# Patient Record
Sex: FEMALE | Race: WHITE | NOT HISPANIC OR LATINO | Employment: OTHER | URBAN - METROPOLITAN AREA
[De-identification: names, ages, dates, MRNs, and addresses within clinical notes are randomized per-mention and may not be internally consistent; named-entity substitution may affect disease eponyms.]

---

## 2023-05-04 LAB — URINE CULTURE: ABNORMAL

## 2023-07-27 LAB
BASOPHILS (10*3/UL) IN BLOOD BY AUTOMATED COUNT: 0.07 X10E9/L (ref 0–0.1)
BASOPHILS/100 LEUKOCYTES IN BLOOD BY AUTOMATED COUNT: 0.8 % (ref 0–2)
EOSINOPHILS (10*3/UL) IN BLOOD BY AUTOMATED COUNT: 0.23 X10E9/L (ref 0–0.7)
EOSINOPHILS/100 LEUKOCYTES IN BLOOD BY AUTOMATED COUNT: 2.5 % (ref 0–6)
ERYTHROCYTE DISTRIBUTION WIDTH (RATIO) BY AUTOMATED COUNT: 14.9 % (ref 11.5–14.5)
ERYTHROCYTE MEAN CORPUSCULAR HEMOGLOBIN CONCENTRATION (G/DL) BY AUTOMATED: 31.9 G/DL (ref 32–36)
ERYTHROCYTE MEAN CORPUSCULAR VOLUME (FL) BY AUTOMATED COUNT: 83 FL (ref 80–100)
ERYTHROCYTES (10*6/UL) IN BLOOD BY AUTOMATED COUNT: 4.38 X10E12/L (ref 4–5.2)
FERRITIN (UG/LL) IN SER/PLAS: 32 UG/L (ref 8–150)
HEMATOCRIT (%) IN BLOOD BY AUTOMATED COUNT: 36.4 % (ref 36–46)
HEMOGLOBIN (G/DL) IN BLOOD: 11.6 G/DL (ref 12–16)
IMMATURE GRANULOCYTES/100 LEUKOCYTES IN BLOOD BY AUTOMATED COUNT: 0.2 % (ref 0–0.9)
IRON (UG/DL) IN SER/PLAS: 40 UG/DL (ref 35–150)
IRON BINDING CAPACITY (UG/DL) IN SER/PLAS: 398 UG/DL (ref 240–445)
IRON SATURATION (%) IN SER/PLAS: 10 % (ref 25–45)
LEUKOCYTES (10*3/UL) IN BLOOD BY AUTOMATED COUNT: 9 X10E9/L (ref 4.4–11.3)
LYMPHOCYTES (10*3/UL) IN BLOOD BY AUTOMATED COUNT: 2.57 X10E9/L (ref 1.2–4.8)
LYMPHOCYTES/100 LEUKOCYTES IN BLOOD BY AUTOMATED COUNT: 28.4 % (ref 13–44)
MONOCYTES (10*3/UL) IN BLOOD BY AUTOMATED COUNT: 0.83 X10E9/L (ref 0.1–1)
MONOCYTES/100 LEUKOCYTES IN BLOOD BY AUTOMATED COUNT: 9.2 % (ref 2–10)
NEUTROPHILS (10*3/UL) IN BLOOD BY AUTOMATED COUNT: 5.32 X10E9/L (ref 1.2–7.7)
NEUTROPHILS/100 LEUKOCYTES IN BLOOD BY AUTOMATED COUNT: 58.9 % (ref 40–80)
NRBC (PER 100 WBCS) BY AUTOMATED COUNT: 0 /100 WBC (ref 0–0)
PLATELETS (10*3/UL) IN BLOOD AUTOMATED COUNT: 272 X10E9/L (ref 150–450)

## 2023-11-09 ENCOUNTER — TELEPHONE (OUTPATIENT)
Dept: GASTROENTEROLOGY | Facility: CLINIC | Age: 68
End: 2023-11-09
Payer: MEDICAID

## 2023-12-07 DIAGNOSIS — I25.10 ATHEROSCLEROTIC HEART DISEASE OF NATIVE CORONARY ARTERY WITHOUT ANGINA PECTORIS: ICD-10-CM

## 2023-12-08 DIAGNOSIS — I10 BENIGN ESSENTIAL HYPERTENSION: ICD-10-CM

## 2023-12-08 PROBLEM — M54.16 LUMBAR RADICULOPATHY: Status: ACTIVE | Noted: 2023-12-08

## 2023-12-08 PROBLEM — E53.8 VITAMIN B12 DEFICIENCY: Status: ACTIVE | Noted: 2023-12-08

## 2023-12-08 PROBLEM — F17.200 CURRENT EVERY DAY SMOKER: Status: ACTIVE | Noted: 2023-12-08

## 2023-12-08 PROBLEM — F10.20 ALCOHOLISM (MULTI): Status: ACTIVE | Noted: 2023-12-08

## 2023-12-08 PROBLEM — G47.00 INSOMNIA: Status: ACTIVE | Noted: 2023-12-08

## 2023-12-08 PROBLEM — M54.50 LOW BACK PAIN: Status: ACTIVE | Noted: 2023-12-08

## 2023-12-08 PROBLEM — F31.9 BIPOLAR DISORDER, UNSPECIFIED (MULTI): Status: ACTIVE | Noted: 2023-12-08

## 2023-12-08 PROBLEM — F32.A DEPRESSION: Status: ACTIVE | Noted: 2023-12-08

## 2023-12-08 PROBLEM — F13.10 BENZODIAZEPINE ABUSE (MULTI): Status: ACTIVE | Noted: 2023-12-08

## 2023-12-08 RX ORDER — METOPROLOL SUCCINATE 25 MG/1
25 TABLET, EXTENDED RELEASE ORAL DAILY
COMMUNITY

## 2023-12-08 RX ORDER — LANOLIN ALCOHOL/MO/W.PET/CERES
1 CREAM (GRAM) TOPICAL DAILY
COMMUNITY
Start: 2021-07-12

## 2023-12-08 RX ORDER — SUCRALFATE 1 G/1
TABLET ORAL
COMMUNITY
Start: 2023-06-30 | End: 2024-02-05 | Stop reason: ALTCHOICE

## 2023-12-08 RX ORDER — FLUTICASONE FUROATE, UMECLIDINIUM BROMIDE AND VILANTEROL TRIFENATATE 200; 62.5; 25 UG/1; UG/1; UG/1
POWDER RESPIRATORY (INHALATION)
COMMUNITY
Start: 2023-01-09 | End: 2024-02-05 | Stop reason: ALTCHOICE

## 2023-12-08 RX ORDER — BUSPIRONE HYDROCHLORIDE 30 MG/1
30 TABLET ORAL 2 TIMES DAILY
COMMUNITY
End: 2024-02-02

## 2023-12-08 RX ORDER — LISINOPRIL 20 MG/1
20 TABLET ORAL DAILY
COMMUNITY
End: 2024-02-05 | Stop reason: ALTCHOICE

## 2023-12-08 RX ORDER — ATORVASTATIN CALCIUM 80 MG/1
80 TABLET, FILM COATED ORAL DAILY
COMMUNITY
Start: 2023-11-30 | End: 2024-01-03 | Stop reason: SDUPTHER

## 2023-12-08 RX ORDER — TRAZODONE HYDROCHLORIDE 100 MG/1
100 TABLET ORAL NIGHTLY
COMMUNITY
End: 2024-02-19 | Stop reason: SDUPTHER

## 2023-12-08 RX ORDER — PNV NO.95/FERROUS FUM/FOLIC AC 28MG-0.8MG
1 TABLET ORAL DAILY
COMMUNITY
Start: 2022-06-28

## 2023-12-08 RX ORDER — HYDROXYZINE PAMOATE 50 MG/1
CAPSULE ORAL
COMMUNITY
End: 2024-02-19 | Stop reason: WASHOUT

## 2023-12-08 RX ORDER — GABAPENTIN 300 MG/1
300 CAPSULE ORAL 3 TIMES DAILY
COMMUNITY
End: 2024-02-05 | Stop reason: ALTCHOICE

## 2023-12-08 RX ORDER — FOLIC ACID 1 MG/1
1 TABLET ORAL DAILY
COMMUNITY
Start: 2021-07-12 | End: 2024-02-05 | Stop reason: ALTCHOICE

## 2023-12-08 RX ORDER — CHOLECALCIFEROL (VITAMIN D3) 25 MCG
1 TABLET ORAL DAILY
COMMUNITY
Start: 2021-12-14

## 2023-12-08 RX ORDER — ALBUTEROL SULFATE 90 UG/1
AEROSOL, METERED RESPIRATORY (INHALATION)
COMMUNITY
Start: 2023-01-09 | End: 2024-04-08 | Stop reason: SDUPTHER

## 2023-12-08 RX ORDER — PANTOPRAZOLE SODIUM 40 MG/1
40 TABLET, DELAYED RELEASE ORAL 2 TIMES DAILY
COMMUNITY
Start: 2023-07-28

## 2023-12-08 RX ORDER — FLUOXETINE HYDROCHLORIDE 40 MG/1
40 CAPSULE ORAL DAILY
COMMUNITY
End: 2024-02-19 | Stop reason: SDUPTHER

## 2023-12-08 RX ORDER — CLOPIDOGREL BISULFATE 75 MG/1
75 TABLET ORAL DAILY
COMMUNITY
Start: 2023-12-03 | End: 2023-12-08 | Stop reason: SDUPTHER

## 2023-12-08 RX ORDER — ESOMEPRAZOLE MAGNESIUM 40 MG/1
40 CAPSULE, DELAYED RELEASE ORAL DAILY
COMMUNITY

## 2023-12-09 RX ORDER — CLOPIDOGREL BISULFATE 75 MG/1
75 TABLET ORAL DAILY
Qty: 30 TABLET | Refills: 0 | Status: SHIPPED | OUTPATIENT
Start: 2023-12-09 | End: 2024-01-08

## 2023-12-11 RX ORDER — CLOPIDOGREL BISULFATE 75 MG/1
75 TABLET ORAL DAILY
Qty: 90 TABLET | Refills: 3 | Status: SHIPPED | OUTPATIENT
Start: 2023-12-11 | End: 2024-04-15 | Stop reason: SDUPTHER

## 2023-12-26 DIAGNOSIS — I25.10 ATHEROSCLEROTIC HEART DISEASE OF NATIVE CORONARY ARTERY WITHOUT ANGINA PECTORIS: ICD-10-CM

## 2023-12-27 ENCOUNTER — TELEPHONE (OUTPATIENT)
Dept: PRIMARY CARE | Facility: CLINIC | Age: 68
End: 2023-12-27
Payer: MEDICAID

## 2024-01-03 ENCOUNTER — TELEPHONE (OUTPATIENT)
Dept: PRIMARY CARE | Facility: CLINIC | Age: 69
End: 2024-01-03
Payer: MEDICAID

## 2024-01-03 DIAGNOSIS — I10 BENIGN ESSENTIAL HYPERTENSION: Primary | ICD-10-CM

## 2024-01-03 RX ORDER — ATORVASTATIN CALCIUM 80 MG/1
80 TABLET, FILM COATED ORAL DAILY
Qty: 90 TABLET | Refills: 1 | Status: SHIPPED | OUTPATIENT
Start: 2024-01-03 | End: 2025-01-02

## 2024-01-10 RX ORDER — ATORVASTATIN CALCIUM 80 MG/1
80 TABLET, FILM COATED ORAL DAILY
Qty: 90 TABLET | Refills: 1 | OUTPATIENT
Start: 2024-01-10

## 2024-01-15 ENCOUNTER — APPOINTMENT (OUTPATIENT)
Dept: CARDIOLOGY | Facility: CLINIC | Age: 69
End: 2024-01-15
Payer: MEDICAID

## 2024-01-29 ENCOUNTER — APPOINTMENT (OUTPATIENT)
Dept: PRIMARY CARE | Facility: CLINIC | Age: 69
End: 2024-01-29
Payer: MEDICAID

## 2024-02-01 PROBLEM — F41.1 GENERALIZED ANXIETY DISORDER: Status: ACTIVE | Noted: 2021-07-22

## 2024-02-01 PROBLEM — K21.9 GERD (GASTROESOPHAGEAL REFLUX DISEASE): Status: ACTIVE | Noted: 2024-02-01

## 2024-02-01 PROBLEM — F10.929 ALCOHOLIC INTOXICATION (CMS-HCC): Status: ACTIVE | Noted: 2024-02-01

## 2024-02-01 PROBLEM — F32.1 CURRENT MODERATE EPISODE OF MAJOR DEPRESSIVE DISORDER WITHOUT PRIOR EPISODE (MULTI): Chronic | Status: ACTIVE | Noted: 2021-07-26

## 2024-02-01 PROBLEM — A63.0 GENITAL WARTS: Status: ACTIVE | Noted: 2024-02-01

## 2024-02-05 ENCOUNTER — OFFICE VISIT (OUTPATIENT)
Dept: CARDIOLOGY | Facility: CLINIC | Age: 69
End: 2024-02-05
Payer: MEDICARE

## 2024-02-05 VITALS
HEART RATE: 64 BPM | BODY MASS INDEX: 23.57 KG/M2 | DIASTOLIC BLOOD PRESSURE: 82 MMHG | SYSTOLIC BLOOD PRESSURE: 150 MMHG | HEIGHT: 63 IN | WEIGHT: 133 LBS

## 2024-02-05 DIAGNOSIS — E78.49 OTHER HYPERLIPIDEMIA: ICD-10-CM

## 2024-02-05 DIAGNOSIS — I25.10 CORONARY ARTERY DISEASE INVOLVING NATIVE CORONARY ARTERY OF NATIVE HEART, UNSPECIFIED WHETHER ANGINA PRESENT: Primary | ICD-10-CM

## 2024-02-05 DIAGNOSIS — I10 BENIGN ESSENTIAL HYPERTENSION: ICD-10-CM

## 2024-02-05 DIAGNOSIS — R07.89 CHEST DISCOMFORT: ICD-10-CM

## 2024-02-05 DIAGNOSIS — F17.200 NICOTINE DEPENDENCE, UNCOMPLICATED, UNSPECIFIED NICOTINE PRODUCT TYPE: ICD-10-CM

## 2024-02-05 PROCEDURE — 1157F ADVNC CARE PLAN IN RCRD: CPT | Performed by: INTERNAL MEDICINE

## 2024-02-05 PROCEDURE — 93000 ELECTROCARDIOGRAM COMPLETE: CPT | Performed by: INTERNAL MEDICINE

## 2024-02-05 PROCEDURE — 4004F PT TOBACCO SCREEN RCVD TLK: CPT | Performed by: INTERNAL MEDICINE

## 2024-02-05 PROCEDURE — 3079F DIAST BP 80-89 MM HG: CPT | Performed by: INTERNAL MEDICINE

## 2024-02-05 PROCEDURE — 1160F RVW MEDS BY RX/DR IN RCRD: CPT | Performed by: INTERNAL MEDICINE

## 2024-02-05 PROCEDURE — 1159F MED LIST DOCD IN RCRD: CPT | Performed by: INTERNAL MEDICINE

## 2024-02-05 PROCEDURE — 99214 OFFICE O/P EST MOD 30 MIN: CPT | Performed by: INTERNAL MEDICINE

## 2024-02-05 PROCEDURE — 3077F SYST BP >= 140 MM HG: CPT | Performed by: INTERNAL MEDICINE

## 2024-02-05 RX ORDER — FLUOXETINE HYDROCHLORIDE 20 MG/1
20 CAPSULE ORAL DAILY
COMMUNITY
End: 2024-02-19 | Stop reason: ALTCHOICE

## 2024-02-05 NOTE — PROGRESS NOTES
"Chief Complaint:   Please see below.     History Of Present Illness:    Carole Perez is a 68 y.o. female presenting with CAD, chest discomfort.    This 68-year-old hypertensive, hyperlipidemic woman with a 20-pack-year history of tobacco abuse has a history of coronary artery disease.  She suffered a non-ST segment elevation MI in June 2023, at which time she underwent PCI of the right coronary artery for single-vessel disease.  She last saw a cardiologist in July 2023.    She reports that she gets heaviness in her chest a couple of times per day.  Usually the heaviness is located in her mid chest and it feels like someone hit her in that region.  The symptoms last about 2 minutes and then resolved.  She sometimes feels a little short of breath along with the chest discomfort.  The chest discomfort does not radiate into the neck, jaw, arms, or elsewhere.  She has had such symptoms occur during housework and household chores as well.  She denies palpitations, orthopnea, PND, and syncope.  Sometimes she gets a little lightheaded when she is climbing out of the bathtub.  Her functional capacity is limited.  She has to stop after walking about 100 feet or so.    Unfortunately she is still smoking cigarettes.     Last Recorded Vitals:  Vitals:    02/05/24 0900   BP: 150/82   BP Location: Left arm   Patient Position: Sitting   Pulse: 64   Weight: 60.3 kg (133 lb)   Height: 1.6 m (5' 3\")   Body mass index is 23.56 kg/m².      Past Medical History:  She has a past medical history of Acute maxillary sinusitis, unspecified (12/01/2021).    Past Surgical History:  She has a past surgical history that includes Hysterectomy (04/17/2014); Colonoscopy (04/17/2014); Other surgical history (12/02/2021); Other surgical history (12/02/2021); and Other surgical history (12/02/2021).      Social History:  She reports that she has been smoking cigarettes. She started smoking about 56 years ago. She has been smoking an average of .5 " packs per day. She has never used smokeless tobacco. She reports current alcohol use of about 3.0 standard drinks of alcohol per week. She reports that she does not use drugs.    Family History:  Family History   Problem Relation Name Age of Onset    Hypertension Mother      Stroke Mother      Other (brain tumor) Father          Allergies:  Patient has no known allergies.    Outpatient Medications:  Current Outpatient Medications   Medication Instructions    albuterol 90 mcg/actuation inhaler INHALE 1 TO 2 PUFFS BY MOUTH EVERY 4 TO 6 HOURS AS NEEDED AS DIRECTED    aspirin 81 mg chewable tablet CHEW 1 TABLET BY MOUTH ONCE DAILY    atorvastatin (LIPITOR) 80 mg, oral, Daily    busPIRone (BUSPAR) 30 mg, oral, 2 times daily    cholecalciferol (Vitamin D-3) 25 MCG (1000 UT) tablet 1 tablet, oral, Daily    clopidogrel (PLAVIX) 75 mg, oral, Daily    cyanocobalamin (Vitamin B-12) 100 mcg tablet 1 tablet, oral, Daily    esomeprazole (NEXIUM) 40 mg, oral, Daily    FLUoxetine (PROZAC) 40 mg, oral, Daily    FLUoxetine (PROZAC) 20 mg, oral, Daily    hydrOXYzine pamoate (Vistaril) 50 mg capsule TAKE 1 CAPSULE BY MOUTH FOUR TIMES DAILY AS NEEDED for anxiety PLEASE KEEP PENDING APPOINTMENT FOR FURTHER REFILL    metoprolol succinate XL (TOPROL-XL) 25 mg, oral, Daily    pantoprazole (PROTONIX) 40 mg, oral, 2 times daily    thiamine 100 mg tablet 1 tablet, oral, Daily    traZODone (DESYREL) 100 mg, oral, Nightly       Physical Exam:  GENERAL:  pleasant 68 year-old  HEENT: No xanthelasma  NECK: Supple, no palpable adenopathy or thyromegaly  CHEST: Clear to auscultation, respiratory effort unlabored  CARDIAC: RRR, normal S1 and S2, no audible murmur, rub, gallop, carotids are brisk, PMI is not displaced  ABD: Active bowel sounds, nontender, no organomegaly, no evidence of ascites  EXT: No clubbing, cyanosis, edema, or tenderness  NEURO: Awake, alert, appropriate, speech is fluent       Last Labs:  CBC -  Lab Results   Component Value  Date    WBC 9.0 07/27/2023    HGB 11.6 (L) 07/27/2023    HCT 36.4 07/27/2023    MCV 83 07/27/2023     07/27/2023       CMP -  Lab Results   Component Value Date    CALCIUM 8.3 (L) 06/30/2023    PHOS 3.0 06/30/2023    PROT 6.6 06/28/2023    ALBUMIN 3.8 06/28/2023    AST 47 (H) 06/28/2023    ALT 10 06/28/2023    ALKPHOS 59 06/28/2023    BILITOT 0.5 06/28/2023       LIPID PANEL -   Lab Results   Component Value Date    CHOL 189 12/21/2020    TRIG 123 12/21/2020    HDL 63.0 12/21/2020    CHHDL 3.0 12/21/2020    LDLF 101 (H) 12/21/2020    VLDL 25 12/21/2020       RENAL FUNCTION PANEL -   Lab Results   Component Value Date    GLUCOSE 116 (H) 06/30/2023     (L) 06/30/2023    K 4.2 06/30/2023     06/30/2023    CO2 23 06/30/2023    ANIONGAP 14 06/30/2023    BUN 11 06/30/2023    CREATININE 0.47 (L) 06/30/2023    CALCIUM 8.3 (L) 06/30/2023    PHOS 3.0 06/30/2023    ALBUMIN 3.8 06/28/2023        Lab Results   Component Value Date     (H) 06/28/2023    HGBA1C 5.9 12/21/2020         No recent results to review    Assessment/Plan   Problem List Items Addressed This Visit          Cardiac and Vasculature    Benign essential hypertension    Relevant Orders    ECG 12 lead (Clinic Performed)    Chest discomfort    Relevant Orders    Nuclear Stress Test    Follow Up In Cardiology    Coronary artery disease involving native coronary artery of native heart - Primary    Relevant Orders    ECG 12 lead (Clinic Performed)    Lipid panel    CBC    Comprehensive metabolic panel    Other hyperlipidemia       Tobacco    Nicotine dependence, uncomplicated    Relevant Orders    Referral to Tobacco Cessation Counseling   This 68-year-old hypertensive, hyperlipidemic 20-pack-year smoker has coronary artery disease with prior non-ST segment elevation MI in June 2023 with subsequent PCI of the RCA for single-vessel disease.  She complains of daily episodes of chest heaviness as described, and her limited functional capacity  precludes treadmill exercise stress testing.  Our approach:    1.  Lexiscan SPECT  2.  Lipid profile, CMP, CBC  3.  Above all else, I advised the patient to quit tobacco, or else risk certain future cardiovascular morbidity, and/or mortality.  - referral to the tobacco cessation clinic.    Dragan Gómez MD

## 2024-02-05 NOTE — PATIENT INSTRUCTIONS
It was my pleasure to meet you.  I look forward to being your cardiologist.  I am a huge believer in communicating with my patients.  Please contact me at any time, if anything is not clear to you regarding anything we have discussed, or if new questions occur to you.     You need to stop smoking. As you know, smoking increases the risk of future heart attacks, strokes, cancer, and emphysema. In addition to these problems, someone who smokes a pack a day spends $2000 per year on tobacco alone. Those costs add up, so that someone who has smoked a pack a day for twenty years has spent $40,000 out of pocket on tobacco in their lifetime. To help you quit smoking, you should call the [place] Quit Line at 3-849-QUIT-NOW. They will have other tips to help you quit. Also, there are good medicines that can help you quit.  [facility] has a tobacco clinic that can guide you through the process.  Just let me know if you'd like a referral.     You should increase your intake of fresh fruits and vegetables.  Try to consume 9-12 servings per day of such foods.  You should increase your intake of deep sea fish such as salmon and tuna.  Try to get two servings per week of fish, but if you are a pregnant woman, talk to your obstetrician before increasing your fish intake.  You should increase your intake of unprocessed nuts such as walnuts or almonds.  Increase your intake of plant-based protein.  You should avoid fried foods.  Don't consume sugary or starchy foods and sugary drinks.  Avoid saturated fats.  Try not to dine at restaurants more than once per month, and don't dine at fast food places.  Try to get 7-9 hours of sleep every night.  Try to get 150 minutes per week of moderate intensity exercise (after I have cleared you to start an exercise program).  Try to maintain the appropriate weight for your height based on body mass index (BMI). Maintain your cholesterol, blood sugar, and blood pressure in the recommended  "respective normal ranges.  There is a wealth of information on the American Heart Association's website regarding this.  Just Google \"Life's Essential 8\" for more information.   Ask me about any of these details  if you have questions.    As your cardiologist, I will be available to you at any time to answer any question you have concerning your heart health.  My staff, [place] can also answer any questions you may have.  Best of luck.     It is important for us to have an accurate list of the medications, supplements, and their doses.  It is also important for us to have an accurate list of your allergies.  Please bring this information to every appointment.  This is a vital part of the quality of care you receive through all of your providers.   "

## 2024-02-19 ENCOUNTER — OFFICE VISIT (OUTPATIENT)
Dept: PRIMARY CARE | Facility: CLINIC | Age: 69
End: 2024-02-19
Payer: MEDICARE

## 2024-02-19 VITALS
DIASTOLIC BLOOD PRESSURE: 70 MMHG | TEMPERATURE: 98.2 F | HEIGHT: 63 IN | SYSTOLIC BLOOD PRESSURE: 112 MMHG | BODY MASS INDEX: 23.64 KG/M2 | WEIGHT: 133.4 LBS | HEART RATE: 70 BPM

## 2024-02-19 DIAGNOSIS — F31.9 BIPOLAR AFFECTIVE DISORDER, REMISSION STATUS UNSPECIFIED (MULTI): Primary | ICD-10-CM

## 2024-02-19 PROCEDURE — 99213 OFFICE O/P EST LOW 20 MIN: CPT | Performed by: FAMILY MEDICINE

## 2024-02-19 PROCEDURE — 1159F MED LIST DOCD IN RCRD: CPT | Performed by: FAMILY MEDICINE

## 2024-02-19 PROCEDURE — 4004F PT TOBACCO SCREEN RCVD TLK: CPT | Performed by: FAMILY MEDICINE

## 2024-02-19 PROCEDURE — 1157F ADVNC CARE PLAN IN RCRD: CPT | Performed by: FAMILY MEDICINE

## 2024-02-19 PROCEDURE — 1160F RVW MEDS BY RX/DR IN RCRD: CPT | Performed by: FAMILY MEDICINE

## 2024-02-19 PROCEDURE — 3074F SYST BP LT 130 MM HG: CPT | Performed by: FAMILY MEDICINE

## 2024-02-19 PROCEDURE — 3078F DIAST BP <80 MM HG: CPT | Performed by: FAMILY MEDICINE

## 2024-02-19 RX ORDER — FLUOXETINE HYDROCHLORIDE 40 MG/1
40 CAPSULE ORAL DAILY
Qty: 90 CAPSULE | Refills: 1 | Status: SHIPPED | OUTPATIENT
Start: 2024-02-19 | End: 2025-02-18

## 2024-02-19 RX ORDER — TRAZODONE HYDROCHLORIDE 100 MG/1
100 TABLET ORAL NIGHTLY
Qty: 90 TABLET | Refills: 1 | Status: SHIPPED | OUTPATIENT
Start: 2024-02-19 | End: 2025-02-18

## 2024-02-19 ASSESSMENT — PATIENT HEALTH QUESTIONNAIRE - PHQ9
1. LITTLE INTEREST OR PLEASURE IN DOING THINGS: NEARLY EVERY DAY
2. FEELING DOWN, DEPRESSED OR HOPELESS: NEARLY EVERY DAY
SUM OF ALL RESPONSES TO PHQ9 QUESTIONS 1 AND 2: 6

## 2024-02-19 NOTE — PROGRESS NOTES
"    /70   Pulse 70   Temp 36.8 °C (98.2 °F)   Ht 1.6 m (5' 3\")   Wt 60.5 kg (133 lb 6.4 oz)   BMI 23.63 kg/m²     Past Medical History:   Diagnosis Date    Acute maxillary sinusitis, unspecified 12/01/2021    Acute maxillary sinusitis       Patient Active Problem List   Diagnosis    Alcoholism (CMS/[facility])    Benign essential hypertension    Benzodiazepine abuse (CMS/[facility])    Nicotine dependence, uncomplicated    Bipolar disorder, unspecified (CMS/[facility])    Depression    Insomnia    Low back pain    Lumbar radiculopathy    Vitamin B12 deficiency    GERD (gastroesophageal reflux disease)    Genital warts    Generalized anxiety disorder    Current moderate episode of major depressive disorder without prior episode (CMS/[facility])    Alcoholic intoxication (CMS/[facility])    Chest discomfort    Coronary artery disease involving native coronary artery of native heart    Other hyperlipidemia       Current Outpatient Medications   Medication Sig Dispense Refill    albuterol 90 mcg/actuation inhaler INHALE 1 TO 2 PUFFS BY MOUTH EVERY 4 TO 6 HOURS AS NEEDED AS DIRECTED      aspirin 81 mg chewable tablet CHEW 1 TABLET BY MOUTH ONCE DAILY 30 tablet 1    atorvastatin (Lipitor) 80 mg tablet Take 1 tablet (80 mg) by mouth once daily. 90 tablet 1    busPIRone (Buspar) 30 mg tablet TAKE 1 TABLET by mouth TWICE DAILY. 180 tablet 0    cholecalciferol (Vitamin D-3) 25 MCG (1000 UT) tablet Take 1 tablet (25 mcg) by mouth once daily.      clopidogrel (Plavix) 75 mg tablet TAKE 1 TABLET BY MOUTH EVERY DAY 90 tablet 3    cyanocobalamin (Vitamin B-12) 100 mcg tablet Take 1 tablet (100 mcg) by mouth once daily.      esomeprazole (NexIUM) 40 mg DR capsule Take 1 capsule (40 mg) by mouth once daily.      FLUoxetine (PROzac) 20 mg capsule Take 1 capsule (20 mg) by mouth once daily.      FLUoxetine (PROzac) 40 mg capsule Take 1 capsule (40 mg) by mouth once daily.      metoprolol succinate XL (Toprol-XL) 25 mg 24 hr tablet Take 1 tablet (25 mg) by " mouth once daily.      pantoprazole (ProtoNix) 40 mg EC tablet Take 1 tablet (40 mg) by mouth 2 times a day.      thiamine 100 mg tablet Take 1 tablet (100 mg) by mouth once daily.      traZODone (Desyrel) 100 mg tablet Take 1 tablet (100 mg) by mouth once daily at bedtime.      hydrOXYzine pamoate (Vistaril) 50 mg capsule TAKE 1 CAPSULE BY MOUTH FOUR TIMES DAILY AS NEEDED for anxiety PLEASE KEEP PENDING APPOINTMENT FOR FURTHER REFILL       No current facility-administered medications for this visit.       CC/HPI/ASSESSMENT/PLAN    CC: Medication    HPI patient with a history of bipolar affective disorder requesting refills for Prozac and trazodone.  Patient saw cardiology as scheduled for extensive cardiac testing and blood work.  She denies fever chills shortness of breath.  Patient notes a cyst in the right armpit that is been bothering her    Exam calm neuro alert and oriented CN II through intact psych calm pleasant female no psychosis.  Skin exam reveals a sebaceous cyst in the right axilla.  We discussed seeing dermatology    A/P 1 bipolar disorder stable.  Medicines refilled.  Patient will see dermatology for the cyst on the right armpit.  She will follow-up with cardiology as previously scheduled and have cardiac testing as scheduled.  Patient notes she is also experiencing some pain in the left hip.  She will monitor the pain and follow-up after cardiac testing complete    There are no diagnoses linked to this encounter.

## 2024-03-13 ENCOUNTER — TELEPHONE (OUTPATIENT)
Dept: CARDIOLOGY | Facility: CLINIC | Age: 69
End: 2024-03-13
Payer: MEDICARE

## 2024-03-13 ENCOUNTER — HOSPITAL ENCOUNTER (OUTPATIENT)
Dept: CARDIOLOGY | Facility: HOSPITAL | Age: 69
Discharge: HOME | End: 2024-03-13
Payer: MEDICARE

## 2024-03-13 DIAGNOSIS — R07.89 CHEST DISCOMFORT: ICD-10-CM

## 2024-03-13 NOTE — TELEPHONE ENCOUNTER
Patient called, she was scheduled for her ordered Nan Stress Test for this morning.  Patient followed our instructions, showed up and was told that her test had not been approved, go home and to call our office.    We were Never notified by the  [employer] Dept. that the test was not approved.  Never informed that we needed to do anything ( ie: submit more records or reschedule the test).    I apologized to the patient, told her I would look into this and call her back.    I emailed Starr Espinosa ([employer]ist) all of the above, I will await her reply/return email.  ---ssd.  ------------------------------------------  Per Starr Espinosa, she saw this when she got into work at 8:30am.  She was able to get the approval and updated the system probably right after the patient was sent home.    I reached out to Angelito Drake ( [facility] nuclear dept.) and asked if he could help get Carole's Nan rescheduled asap.    I will await Angelito's return call.  ---ssd.  -----------------------------------------

## 2024-03-14 ENCOUNTER — APPOINTMENT (OUTPATIENT)
Dept: PRIMARY CARE | Facility: CLINIC | Age: 69
End: 2024-03-14
Payer: MEDICARE

## 2024-03-18 ENCOUNTER — APPOINTMENT (OUTPATIENT)
Dept: CARDIOLOGY | Facility: CLINIC | Age: 69
End: 2024-03-18
Payer: MEDICARE

## 2024-03-22 ENCOUNTER — HOSPITAL ENCOUNTER (OUTPATIENT)
Dept: RADIOLOGY | Facility: HOSPITAL | Age: 69
Discharge: HOME | End: 2024-03-22
Payer: MEDICARE

## 2024-03-22 ENCOUNTER — APPOINTMENT (OUTPATIENT)
Dept: RADIOLOGY | Facility: HOSPITAL | Age: 69
End: 2024-03-22
Payer: MEDICARE

## 2024-03-22 ENCOUNTER — APPOINTMENT (OUTPATIENT)
Dept: CARDIOLOGY | Facility: HOSPITAL | Age: 69
End: 2024-03-22
Payer: MEDICARE

## 2024-03-22 ENCOUNTER — HOSPITAL ENCOUNTER (OUTPATIENT)
Dept: CARDIOLOGY | Facility: HOSPITAL | Age: 69
Discharge: HOME | End: 2024-03-22
Payer: MEDICARE

## 2024-03-22 DIAGNOSIS — R07.9 CHEST PAIN, UNSPECIFIED: ICD-10-CM

## 2024-03-22 DIAGNOSIS — R07.89 CHEST DISCOMFORT: ICD-10-CM

## 2024-03-22 PROCEDURE — 3430000001 HC RX 343 DIAGNOSTIC RADIOPHARMACEUTICALS: Performed by: INTERNAL MEDICINE

## 2024-03-22 PROCEDURE — 78452 HT MUSCLE IMAGE SPECT MULT: CPT

## 2024-03-22 PROCEDURE — 93017 CV STRESS TEST TRACING ONLY: CPT

## 2024-03-22 PROCEDURE — A9502 TC99M TETROFOSMIN: HCPCS | Performed by: INTERNAL MEDICINE

## 2024-03-22 PROCEDURE — 78452 HT MUSCLE IMAGE SPECT MULT: CPT | Performed by: INTERNAL MEDICINE

## 2024-03-22 RX ADMIN — TETROFOSMIN 10.3 MILLICURIE: 0.23 INJECTION, POWDER, LYOPHILIZED, FOR SOLUTION INTRAVENOUS at 11:15

## 2024-03-22 RX ADMIN — TETROFOSMIN 31.7 MILLICURIE: 0.23 INJECTION, POWDER, LYOPHILIZED, FOR SOLUTION INTRAVENOUS at 13:15

## 2024-04-02 ENCOUNTER — HOSPITAL ENCOUNTER (EMERGENCY)
Facility: HOSPITAL | Age: 69
Discharge: HOME | End: 2024-04-02
Attending: STUDENT IN AN ORGANIZED HEALTH CARE EDUCATION/TRAINING PROGRAM
Payer: MEDICARE

## 2024-04-02 ENCOUNTER — APPOINTMENT (OUTPATIENT)
Dept: RADIOLOGY | Facility: HOSPITAL | Age: 69
End: 2024-04-02
Payer: MEDICARE

## 2024-04-02 VITALS
HEART RATE: 78 BPM | TEMPERATURE: 97.3 F | DIASTOLIC BLOOD PRESSURE: 81 MMHG | SYSTOLIC BLOOD PRESSURE: 177 MMHG | RESPIRATION RATE: 18 BRPM | WEIGHT: 130 LBS | OXYGEN SATURATION: 95 % | HEIGHT: 63 IN | BODY MASS INDEX: 23.04 KG/M2

## 2024-04-02 DIAGNOSIS — M25.552 BILATERAL HIP PAIN: Primary | ICD-10-CM

## 2024-04-02 DIAGNOSIS — M25.551 BILATERAL HIP PAIN: Primary | ICD-10-CM

## 2024-04-02 PROCEDURE — 73552 X-RAY EXAM OF FEMUR 2/>: CPT | Mod: 50

## 2024-04-02 PROCEDURE — 72170 X-RAY EXAM OF PELVIS: CPT

## 2024-04-02 PROCEDURE — 73552 X-RAY EXAM OF FEMUR 2/>: CPT | Mod: BILATERAL PROCEDURE | Performed by: RADIOLOGY

## 2024-04-02 PROCEDURE — 99284 EMERGENCY DEPT VISIT MOD MDM: CPT | Performed by: STUDENT IN AN ORGANIZED HEALTH CARE EDUCATION/TRAINING PROGRAM

## 2024-04-02 PROCEDURE — 72170 X-RAY EXAM OF PELVIS: CPT | Mod: FOREIGN READ | Performed by: RADIOLOGY

## 2024-04-02 PROCEDURE — 99284 EMERGENCY DEPT VISIT MOD MDM: CPT

## 2024-04-02 PROCEDURE — 2500000001 HC RX 250 WO HCPCS SELF ADMINISTERED DRUGS (ALT 637 FOR MEDICARE OP)

## 2024-04-02 RX ORDER — IBUPROFEN 600 MG/1
600 TABLET ORAL ONCE
Status: COMPLETED | OUTPATIENT
Start: 2024-04-02 | End: 2024-04-02

## 2024-04-02 RX ADMIN — IBUPROFEN 600 MG: 600 TABLET, FILM COATED ORAL at 15:45

## 2024-04-02 ASSESSMENT — PAIN SCALES - GENERAL
PAINLEVEL_OUTOF10: 8
PAINLEVEL_OUTOF10: 5 - MODERATE PAIN

## 2024-04-02 ASSESSMENT — LIFESTYLE VARIABLES
EVER HAD A DRINK FIRST THING IN THE MORNING TO STEADY YOUR NERVES TO GET RID OF A HANGOVER: NO
HAVE YOU EVER FELT YOU SHOULD CUT DOWN ON YOUR DRINKING: NO
EVER FELT BAD OR GUILTY ABOUT YOUR DRINKING: NO
TOTAL SCORE: 0
HAVE PEOPLE ANNOYED YOU BY CRITICIZING YOUR DRINKING: NO

## 2024-04-02 ASSESSMENT — PAIN - FUNCTIONAL ASSESSMENT
PAIN_FUNCTIONAL_ASSESSMENT: 0-10
PAIN_FUNCTIONAL_ASSESSMENT: 0-10

## 2024-04-02 ASSESSMENT — COLUMBIA-SUICIDE SEVERITY RATING SCALE - C-SSRS
6. HAVE YOU EVER DONE ANYTHING, STARTED TO DO ANYTHING, OR PREPARED TO DO ANYTHING TO END YOUR LIFE?: NO
1. IN THE PAST MONTH, HAVE YOU WISHED YOU WERE DEAD OR WISHED YOU COULD GO TO SLEEP AND NOT WAKE UP?: NO
2. HAVE YOU ACTUALLY HAD ANY THOUGHTS OF KILLING YOURSELF?: NO

## 2024-04-02 ASSESSMENT — PAIN DESCRIPTION - ORIENTATION: ORIENTATION: LEFT

## 2024-04-02 ASSESSMENT — PAIN DESCRIPTION - LOCATION: LOCATION: HIP

## 2024-04-02 ASSESSMENT — PAIN DESCRIPTION - PAIN TYPE: TYPE: ACUTE PAIN

## 2024-04-02 NOTE — ED PROVIDER NOTES
EMERGENCY DEPARTMENT ENCOUNTER      Pt Name: Carole Perez  MRN: [id number]  Birthdate 1955  Date of evaluation: 4/2/2024  Provider: Uriel Weeks MD    CHIEF COMPLAINT       Chief Complaint   Patient presents with    Leg Pain     Left. Mechanical fall 3 days ago.          HISTORY OF PRESENT ILLNESS    HPI  Patient is a 68-year-old female with history of HTN, HLD, EtOH abuse, bipolar disorder, CAD on Plavix presenting after a fall.  She fell at least 1 month ago and struck her left hip.  Since then she has been having worsening pain and had difficulty getting around.  3 days ago, she caught her toes on the lip of a step and ended up falling down, striking her right hip.  Since then, she has had to walk with a cane.  Pain is 7/10, sharp, radiating down the leg, exacerbated with any movement, and without significant alleviating factors.  She did not strike her head or lose consciousness.  She denies visual changes, nausea, vomiting, headache.    Nursing Notes were reviewed.    PAST MEDICAL HISTORY     Past Medical History:   Diagnosis Date    Acute maxillary sinusitis, unspecified 12/01/2021    Acute maxillary sinusitis         SURGICAL HISTORY       Past Surgical History:   Procedure Laterality Date    COLONOSCOPY  04/17/2014    Colonoscopy (Fiberoptic)    HYSTERECTOMY  04/17/2014    Hysterectomy    OTHER SURGICAL HISTORY  12/02/2021    Nasal septoplasty    OTHER SURGICAL HISTORY  12/02/2021    Ectopic pregnancy removal with oophorectomy    OTHER SURGICAL HISTORY  12/02/2021    Hysterocolpectomy vaginal         CURRENT MEDICATIONS       Discharge Medication List as of 4/2/2024  3:37 PM        CONTINUE these medications which have NOT CHANGED    Details   albuterol 90 mcg/actuation inhaler INHALE 1 TO 2 PUFFS BY MOUTH EVERY 4 TO 6 HOURS AS NEEDED AS DIRECTED, Historical Med      aspirin 81 mg chewable tablet CHEW 1 TABLET BY MOUTH ONCE DAILY, Starting Thu 6/29/2023, Until Fri 6/28/2024, Normal      atorvastatin  (Lipitor) 80 mg tablet Take 1 tablet (80 mg) by mouth once daily., Starting Wed 1/3/2024, Until Thu 1/2/2025, Normal      busPIRone (Buspar) 30 mg tablet TAKE 1 TABLET by mouth TWICE DAILY., Starting Fri 2/2/2024, Normal      cholecalciferol (Vitamin D-3) 25 MCG (1000 UT) tablet Take 1 tablet (25 mcg) by mouth once daily., Starting Tue 12/14/2021, Historical Med      clopidogrel (Plavix) 75 mg tablet TAKE 1 TABLET BY MOUTH EVERY DAY, Starting Mon 12/11/2023, Normal      cyanocobalamin (Vitamin B-12) 100 mcg tablet Take 1 tablet (100 mcg) by mouth once daily., Starting Tue 6/28/2022, Historical Med      esomeprazole (NexIUM) 40 mg DR capsule Take 1 capsule (40 mg) by mouth once daily., Historical Med      FLUoxetine (PROzac) 40 mg capsule Take 1 capsule (40 mg) by mouth once daily., Starting Mon 2/19/2024, Until Tue 2/18/2025, Normal      metoprolol succinate XL (Toprol-XL) 25 mg 24 hr tablet Take 1 tablet (25 mg) by mouth once daily., Historical Med      pantoprazole (ProtoNix) 40 mg EC tablet Take 1 tablet (40 mg) by mouth 2 times a day., Starting Fri 7/28/2023, Historical Med      thiamine 100 mg tablet Take 1 tablet (100 mg) by mouth once daily., Starting Mon 7/12/2021, Historical Med      traZODone (Desyrel) 100 mg tablet Take 1 tablet (100 mg) by mouth once daily at bedtime., Starting Mon 2/19/2024, Until Tue 2/18/2025, Normal             ALLERGIES     Patient has no known allergies.    FAMILY HISTORY       Family History   Problem Relation Name Age of Onset    Hypertension Mother      Stroke Mother      Other (brain tumor) Father            SOCIAL HISTORY       Social History     Socioeconomic History    Marital status:      Spouse name: None    Number of children: None    Years of education: None    Highest education level: None   Occupational History    None   Tobacco Use    Smoking status: Every Day     Packs/day: .5     Types: Cigarettes     Start date: 1968    Smokeless tobacco: Never   Substance  and Sexual Activity    Alcohol use: Yes     Alcohol/week: 3.0 standard drinks of alcohol     Types: 3 Standard drinks or equivalent per week    Drug use: Never    Sexual activity: Defer   Other Topics Concern    None   Social History Narrative    None     Social Determinants of Health     Financial Resource Strain: Not on file   Food Insecurity: Not on file   Transportation Needs: Not on file   Physical Activity: Not on file   Stress: Not on file   Social Connections: Not on file   Intimate Partner Violence: Not on file   Housing Stability: Not on file       SCREENINGS                        PHYSICAL EXAM    (up to 7 for level 4, 8 or more for level 5)     ED Triage Vitals [04/02/24 1302]   Temperature Heart Rate Respirations BP   36.3 °C (97.3 °F) 85 16 128/66      Pulse Ox Temp Source Heart Rate Source Patient Position   96 % Temporal Monitor Sitting      BP Location FiO2 (%)     Right arm --       Physical Exam  Constitutional:       General: She is not in acute distress.     Appearance: She is not ill-appearing.   HENT:      Head: Normocephalic and atraumatic.      Nose: Nose normal.      Mouth/Throat:      Mouth: Mucous membranes are moist.      Pharynx: Oropharynx is clear.   Eyes:      Extraocular Movements: Extraocular movements intact.      Conjunctiva/sclera: Conjunctivae normal.   Cardiovascular:      Rate and Rhythm: Normal rate and regular rhythm.      Pulses: Normal pulses.      Heart sounds: Normal heart sounds.   Pulmonary:      Effort: Pulmonary effort is normal. No respiratory distress.      Breath sounds: Normal breath sounds.   Abdominal:      General: There is no distension.      Palpations: Abdomen is soft.      Tenderness: There is no abdominal tenderness.   Musculoskeletal:         General: No deformity or signs of injury.      Cervical back: Normal range of motion and neck supple.      Comments: Minor pain to palpation deep to the right hip.   Skin:     General: Skin is warm and dry.    Neurological:      General: No focal deficit present.          DIAGNOSTIC RESULTS     LABS:  Labs Reviewed - No data to display    All other labs were within normal range or not returned as of this dictation.    Imaging  XR femur 2 VW bilateral   Final Result   1. No evidence for acute injury.   2. Mild degenerative change in both hips.   Signed by Lewis Guthrie MD      XR pelvis 1-2 views   Final Result   1. No evidence for acute injury.   2. Mild degenerative change in both hips.   Signed by Lewis Guthrie MD           Procedures  Procedures     EMERGENCY DEPARTMENT COURSE/MDM:     Diagnoses as of 04/02/24 1633   Bilateral hip pain        Medical Decision Making  History obtained from the patient.  Records including labs, imaging, notes reviewed.  Presentation most consistent with acute pain in the setting of degenerative changes.  That being said, x-rays were obtained of the bilateral hips and femurs with concern for possible fracture or dislocation.  These show degenerative changes only.  In the meantime, patient was given ibuprofen and passed an ambulatory test.  Patient subsequent discharged home in satisfactory condition with a referral to orthopedics.  All questions answered and return precautions discussed.    Patient and or family in agreement and understanding of treatment plan.  All questions answered.      I reviewed the case with the attending ED physician. The attending ED physician agrees with the plan. Patient and/or patient´s representative was counseled regarding labs, imaging, likely diagnosis, and plan. All questions were answered.    ED Medications administered this visit:    Medications   ibuprofen tablet 600 mg (600 mg oral Given 4/2/24 9696)       New Prescriptions from this visit:    Discharge Medication List as of 4/2/2024  3:37 PM          Follow-up:  Ganga Ortiz DO  [id number]  [facility] [address] [phone]    Schedule an appointment as soon as possible for a visit            Final Impression:   1. Bilateral hip pain          (Please note that portions of this note were completed with a voice recognition program.  Efforts were made to edit the dictations but occasionally words are mis-transcribed.)     Uriel Weeks MD  Resident  04/02/24 9797

## 2024-04-02 NOTE — DISCHARGE INSTRUCTIONS
You were evaluated for hip pain after a fall.  Your x-ray showed degenerative changes but no fracture or dislocation.  We encourage you to ice 20 minutes on, 20 minutes off as tolerated.  You can use ibuprofen as needed for pain.  Please follow-up with orthopedics at your earliest convenience for further evaluation and treatment as indicated.  If you become unable to walk, return to the emergency department.

## 2024-04-05 ENCOUNTER — OFFICE VISIT (OUTPATIENT)
Dept: CARDIOLOGY | Facility: CLINIC | Age: 69
End: 2024-04-05
Payer: MEDICARE

## 2024-04-05 VITALS
HEART RATE: 52 BPM | DIASTOLIC BLOOD PRESSURE: 54 MMHG | SYSTOLIC BLOOD PRESSURE: 102 MMHG | WEIGHT: 130 LBS | BODY MASS INDEX: 23.04 KG/M2 | HEIGHT: 63 IN

## 2024-04-05 DIAGNOSIS — R07.89 CHEST DISCOMFORT: ICD-10-CM

## 2024-04-05 DIAGNOSIS — F17.200 NICOTINE DEPENDENCE, UNCOMPLICATED, UNSPECIFIED NICOTINE PRODUCT TYPE: ICD-10-CM

## 2024-04-05 DIAGNOSIS — I25.10 CORONARY ARTERY DISEASE INVOLVING NATIVE CORONARY ARTERY OF NATIVE HEART WITHOUT ANGINA PECTORIS: Primary | ICD-10-CM

## 2024-04-05 DIAGNOSIS — I10 BENIGN ESSENTIAL HYPERTENSION: ICD-10-CM

## 2024-04-05 PROCEDURE — 1159F MED LIST DOCD IN RCRD: CPT | Performed by: INTERNAL MEDICINE

## 2024-04-05 PROCEDURE — 1160F RVW MEDS BY RX/DR IN RCRD: CPT | Performed by: INTERNAL MEDICINE

## 2024-04-05 PROCEDURE — 4004F PT TOBACCO SCREEN RCVD TLK: CPT | Performed by: INTERNAL MEDICINE

## 2024-04-05 PROCEDURE — 99214 OFFICE O/P EST MOD 30 MIN: CPT | Performed by: INTERNAL MEDICINE

## 2024-04-05 PROCEDURE — 3074F SYST BP LT 130 MM HG: CPT | Performed by: INTERNAL MEDICINE

## 2024-04-05 PROCEDURE — 3078F DIAST BP <80 MM HG: CPT | Performed by: INTERNAL MEDICINE

## 2024-04-05 PROCEDURE — 1157F ADVNC CARE PLAN IN RCRD: CPT | Performed by: INTERNAL MEDICINE

## 2024-04-05 NOTE — PATIENT INSTRUCTIONS

## 2024-04-05 NOTE — PROGRESS NOTES
"Chief Complaint:   Please see below.     History Of Present Illness:    Carole Perez is a 68 y.o. female presenting with CAD    This 68-year-old hypertensive, hyperlipidemic 20-pack-year smoker has coronary artery disease with prior non-ST segment elevation MI in June 2023 with subsequent PCI of the RCA for single-vessel disease.  Her SPECT on March 22, 2024 was negative for ischemia.  The patient denies chest discomfort, dyspnea, palpitations, orthopnea, PND, syncope, and near syncope.  She has not yet had the lab orders performed that I ordered at the previous visit.    The patient is still smoking despite my warnings.     Last Recorded Vitals:  Vitals:    04/05/24 1100   BP: 102/54   BP Location: Left arm   Patient Position: Sitting   Pulse: 52   Weight: 59 kg (130 lb)   Height: 1.6 m (5' 3\")       Past Medical History:  She has a past medical history of Acute maxillary sinusitis, unspecified (12/01/2021).    Past Surgical History:  She has a past surgical history that includes Hysterectomy (04/17/2014); Colonoscopy (04/17/2014); Other surgical history (12/02/2021); Other surgical history (12/02/2021); and Other surgical history (12/02/2021).      Social History:  She reports that she has been smoking cigarettes. She started smoking about 56 years ago. She has been smoking an average of .5 packs per day. She has never used smokeless tobacco. She reports current alcohol use of about 3.0 standard drinks of alcohol per week. She reports that she does not use drugs.    Family History:  Family History   Problem Relation Name Age of Onset    Hypertension Mother      Stroke Mother      Other (brain tumor) Father          Allergies:  Patient has no known allergies.    Outpatient Medications:  Current Outpatient Medications   Medication Instructions    albuterol 90 mcg/actuation inhaler INHALE 1 TO 2 PUFFS BY MOUTH EVERY 4 TO 6 HOURS AS NEEDED AS DIRECTED    aspirin 81 mg chewable tablet CHEW 1 TABLET BY MOUTH ONCE " DAILY    atorvastatin (LIPITOR) 80 mg, oral, Daily    busPIRone (BUSPAR) 30 mg, oral, 2 times daily    cholecalciferol (Vitamin D-3) 25 MCG (1000 UT) tablet 1 tablet, oral, Daily    clopidogrel (PLAVIX) 75 mg, oral, Daily    cyanocobalamin (Vitamin B-12) 100 mcg tablet 1 tablet, oral, Daily    esomeprazole (NEXIUM) 40 mg, oral, Daily    FLUoxetine (PROZAC) 40 mg, oral, Daily    metoprolol succinate XL (TOPROL-XL) 25 mg, oral, Daily    pantoprazole (PROTONIX) 40 mg, oral, 2 times daily    thiamine 100 mg tablet 1 tablet, oral, Daily    traZODone (DESYREL) 100 mg, oral, Nightly       Physical Exam:  GENERAL:  pleasant 68 year-old  HEENT: No xanthelasma  NECK: Supple, no palpable adenopathy or thyromegaly  CHEST: Clear to auscultation, respiratory effort unlabored  CARDIAC: RRR, normal S1 and S2, no audible murmur, rub, gallop, carotids are brisk, PMI is not displaced  ABD: Active bowel sounds, nontender, no organomegaly, no evidence of ascites  EXT: No clubbing, cyanosis, edema, or tenderness  NEURO: Awake, alert, appropriate, speech is fluent       Last Labs:  CBC -  Lab Results   Component Value Date    WBC 9.0 07/27/2023    HGB 11.6 (L) 07/27/2023    HCT 36.4 07/27/2023    MCV 83 07/27/2023     07/27/2023       CMP -  Lab Results   Component Value Date    CALCIUM 8.3 (L) 06/30/2023    PHOS 3.0 06/30/2023    PROT 6.6 06/28/2023    ALBUMIN 3.8 06/28/2023    AST 47 (H) 06/28/2023    ALT 10 06/28/2023    ALKPHOS 59 06/28/2023    BILITOT 0.5 06/28/2023       LIPID PANEL -   Lab Results   Component Value Date    CHOL 189 12/21/2020    TRIG 123 12/21/2020    HDL 63.0 12/21/2020    CHHDL 3.0 12/21/2020    LDLF 101 (H) 12/21/2020    VLDL 25 12/21/2020       RENAL FUNCTION PANEL -   Lab Results   Component Value Date    GLUCOSE 116 (H) 06/30/2023     (L) 06/30/2023    K 4.2 06/30/2023     06/30/2023    CO2 23 06/30/2023    ANIONGAP 14 06/30/2023    BUN 11 06/30/2023    CREATININE 0.47 (L) 06/30/2023     CALCIUM 8.3 (L) 06/30/2023    PHOS 3.0 06/30/2023    ALBUMIN 3.8 06/28/2023        Lab Results   Component Value Date     (H) 06/28/2023    HGBA1C 5.9 12/21/2020         Lab review: I have Chemistry CMP:   Lab Results   Component Value Date    ALBUMIN 3.8 06/28/2023    CALCIUM 8.3 (L) 06/30/2023    CO2 23 06/30/2023    CREATININE 0.47 (L) 06/30/2023    GLUCOSE 116 (H) 06/30/2023    BILITOT 0.5 06/28/2023    PROT 6.6 06/28/2023    ALT 10 06/28/2023    AST 47 (H) 06/28/2023    ALKPHOS 59 06/28/2023   , Chemistry BMP   Lab Results   Component Value Date    GLUCOSE 116 (H) 06/30/2023    CALCIUM 8.3 (L) 06/30/2023    CO2 23 06/30/2023    CREATININE 0.47 (L) 06/30/2023   , CBC:  Lab Results   Component Value Date    WBC 9.0 07/27/2023    RBC 4.38 07/27/2023    HGB 11.6 (L) 07/27/2023    HCT 36.4 07/27/2023    MCV 83 07/27/2023    MCHC 31.9 (L) 07/27/2023    RDW 14.9 (H) 07/27/2023    NRBC 0.0 07/27/2023   , and Lipids:   Lab Results   Component Value Date    CHOL 189 12/21/2020    HDL 63.0 12/21/2020    TRIG 123 12/21/2020     Diagnostic review: I have independently interpreted the SPECT.  My findings are please see the report for complete details..    Assessment/Plan   Problem List Items Addressed This Visit          Cardiac and Vasculature    Benign essential hypertension    Chest discomfort    Coronary artery disease involving native coronary artery of native heart - Primary       Tobacco    Nicotine dependence, uncomplicated     1.  CAD: The patient has stable, functional class I CAD, and is doing well.  No additional testing is necessary at present. Important aspects of lifestyle modification were discussed in detail with the patient.  Recent labs and testing have been reviewed.  -Advised her to discontinue her clopidogrel at the 1 year anniversary of her PCI in July 2024.  2.  Above all else, I advised the patient to quit tobacco, or else risk certain future cardiovascular morbidity, and/or mortality.  3.  HTN:  BP is well controlled.   We discussed sodium restriction, lifestyle modification, and the DASH diet.  I advised the patient to check BPs at home daily, and to contact me with an update in one month.    Dragan Gómez MD

## 2024-04-08 ENCOUNTER — OFFICE VISIT (OUTPATIENT)
Dept: PRIMARY CARE | Facility: CLINIC | Age: 69
End: 2024-04-08
Payer: MEDICARE

## 2024-04-08 VITALS
HEART RATE: 62 BPM | HEIGHT: 63 IN | WEIGHT: 132 LBS | DIASTOLIC BLOOD PRESSURE: 62 MMHG | SYSTOLIC BLOOD PRESSURE: 90 MMHG | BODY MASS INDEX: 23.39 KG/M2

## 2024-04-08 DIAGNOSIS — F31.9 BIPOLAR DISORDER, UNSPECIFIED (MULTI): ICD-10-CM

## 2024-04-08 DIAGNOSIS — M16.0 PRIMARY OSTEOARTHRITIS OF BOTH HIPS: Primary | ICD-10-CM

## 2024-04-08 PROCEDURE — 1160F RVW MEDS BY RX/DR IN RCRD: CPT | Performed by: FAMILY MEDICINE

## 2024-04-08 PROCEDURE — 3078F DIAST BP <80 MM HG: CPT | Performed by: FAMILY MEDICINE

## 2024-04-08 PROCEDURE — 1159F MED LIST DOCD IN RCRD: CPT | Performed by: FAMILY MEDICINE

## 2024-04-08 PROCEDURE — 3074F SYST BP LT 130 MM HG: CPT | Performed by: FAMILY MEDICINE

## 2024-04-08 PROCEDURE — 99214 OFFICE O/P EST MOD 30 MIN: CPT | Performed by: FAMILY MEDICINE

## 2024-04-08 PROCEDURE — 1157F ADVNC CARE PLAN IN RCRD: CPT | Performed by: FAMILY MEDICINE

## 2024-04-08 PROCEDURE — 4004F PT TOBACCO SCREEN RCVD TLK: CPT | Performed by: FAMILY MEDICINE

## 2024-04-08 RX ORDER — ALBUTEROL SULFATE 90 UG/1
2 AEROSOL, METERED RESPIRATORY (INHALATION)
Qty: 18 G | Refills: 1 | Status: SHIPPED | OUTPATIENT
Start: 2024-04-08 | End: 2024-06-07

## 2024-04-08 RX ORDER — GABAPENTIN 300 MG/1
300 CAPSULE ORAL 2 TIMES DAILY
Qty: 60 CAPSULE | Refills: 1 | Status: SHIPPED | OUTPATIENT
Start: 2024-04-08 | End: 2024-06-07

## 2024-04-08 RX ORDER — BUSPIRONE HYDROCHLORIDE 30 MG/1
30 TABLET ORAL 2 TIMES DAILY
Qty: 180 TABLET | Refills: 0 | Status: SHIPPED | OUTPATIENT
Start: 2024-04-08

## 2024-04-08 ASSESSMENT — PATIENT HEALTH QUESTIONNAIRE - PHQ9
4. FEELING TIRED OR HAVING LITTLE ENERGY: NEARLY EVERY DAY
2. FEELING DOWN, DEPRESSED OR HOPELESS: NEARLY EVERY DAY
SUM OF ALL RESPONSES TO PHQ9 QUESTIONS 1 AND 2: 6
7. TROUBLE CONCENTRATING ON THINGS, SUCH AS READING THE NEWSPAPER OR WATCHING TELEVISION: NEARLY EVERY DAY
10. IF YOU CHECKED OFF ANY PROBLEMS, HOW DIFFICULT HAVE THESE PROBLEMS MADE IT FOR YOU TO DO YOUR WORK, TAKE CARE OF THINGS AT HOME, OR GET ALONG WITH OTHER PEOPLE: VERY DIFFICULT
6. FEELING BAD ABOUT YOURSELF - OR THAT YOU ARE A FAILURE OR HAVE LET YOURSELF OR YOUR FAMILY DOWN: SEVERAL DAYS
5. POOR APPETITE OR OVEREATING: MORE THAN HALF THE DAYS
3. TROUBLE FALLING OR STAYING ASLEEP OR SLEEPING TOO MUCH: NEARLY EVERY DAY
9. THOUGHTS THAT YOU WOULD BE BETTER OFF DEAD, OR OF HURTING YOURSELF: NOT AT ALL
1. LITTLE INTEREST OR PLEASURE IN DOING THINGS: NEARLY EVERY DAY
SUM OF ALL RESPONSES TO PHQ QUESTIONS 1-9: 18
8. MOVING OR SPEAKING SO SLOWLY THAT OTHER PEOPLE COULD HAVE NOTICED. OR THE OPPOSITE, BEING SO FIGETY OR RESTLESS THAT YOU HAVE BEEN MOVING AROUND A LOT MORE THAN USUAL: NOT AT ALL

## 2024-04-08 NOTE — PROGRESS NOTES
"    BP 90/62   Pulse 62   Ht 1.6 m (5' 3\")   Wt 59.9 kg (132 lb)   BMI 23.38 kg/m²     Past Medical History:   Diagnosis Date    Acute maxillary sinusitis, unspecified 12/01/2021    Acute maxillary sinusitis       Patient Active Problem List   Diagnosis    Alcoholism (CMS/[facility])    Benign essential hypertension    Benzodiazepine abuse (CMS/[facility])    Nicotine dependence, uncomplicated    Bipolar disorder, unspecified (CMS/[facility])    Depression    Insomnia    Low back pain    Lumbar radiculopathy    Vitamin B12 deficiency    GERD (gastroesophageal reflux disease)    Genital warts    Generalized anxiety disorder    Current moderate episode of major depressive disorder without prior episode (CMS/[facility])    Alcoholic intoxication (CMS/[facility])    Chest discomfort    Coronary artery disease involving native coronary artery of native heart    Other hyperlipidemia       Current Outpatient Medications   Medication Sig Dispense Refill    albuterol 90 mcg/actuation inhaler INHALE 1 TO 2 PUFFS BY MOUTH EVERY 4 TO 6 HOURS AS NEEDED AS DIRECTED      aspirin 81 mg chewable tablet CHEW 1 TABLET BY MOUTH ONCE DAILY 30 tablet 1    atorvastatin (Lipitor) 80 mg tablet Take 1 tablet (80 mg) by mouth once daily. 90 tablet 1    busPIRone (Buspar) 30 mg tablet TAKE 1 TABLET by mouth TWICE DAILY. 180 tablet 0    cholecalciferol (Vitamin D-3) 25 MCG (1000 UT) tablet Take 1 tablet (25 mcg) by mouth once daily.      clopidogrel (Plavix) 75 mg tablet TAKE 1 TABLET BY MOUTH EVERY DAY 90 tablet 3    cyanocobalamin (Vitamin B-12) 100 mcg tablet Take 1 tablet (100 mcg) by mouth once daily.      FLUoxetine (PROzac) 40 mg capsule Take 1 capsule (40 mg) by mouth once daily. 90 capsule 1    metoprolol succinate XL (Toprol-XL) 25 mg 24 hr tablet Take 1 tablet (25 mg) by mouth once daily.      pantoprazole (ProtoNix) 40 mg EC tablet Take 1 tablet (40 mg) by mouth 2 times a day.      thiamine 100 mg tablet Take 1 tablet (100 mg) by mouth once daily.      traZODone " (Desyrel) 100 mg tablet Take 1 tablet (100 mg) by mouth once daily at bedtime. 90 tablet 1    esomeprazole (NexIUM) 40 mg DR capsule Take 1 capsule (40 mg) by mouth once daily.       No current facility-administered medications for this visit.       CC/HPI/ASSESSMENT/PLAN    CC hip pain    HPI patient notes couple week history of intense pain left hip.  She went to urgent care recently, x-rays showed arthritic changes no fracture.  Patient notes the pain continues.  She is using a cane.  We discussed seeing orthopedist and possible therapy.  She takes Plavix for coronary artery disease therefore unable to take anti-inflammatory medication.  She is taking Tylenol.  We discussed trying gabapentin for the pain.  She has a history of bipolar disorder request refill for BuSpar.  She continues to smoke, advised to quit smoking.    Exam calm Ext pain with range of motion both hips left hip worse.  No edema noted psych calm pleasant female no psychosis neuro alert oriented CN II 12 intact    A/P 1.  Osteoarthritis both hips.  She will see orthopedist in consultation.  X-rays were reviewed.  Gabapentin is ordered.  She is unable to use anti-inflammatory medications.  [facility] reports been reviewed.  #2 bipolar disease chronic stable.  Buspirone is refilled.    There are no diagnoses linked to this encounter.

## 2024-04-15 DIAGNOSIS — I25.10 ATHEROSCLEROTIC HEART DISEASE OF NATIVE CORONARY ARTERY WITHOUT ANGINA PECTORIS: ICD-10-CM

## 2024-04-15 RX ORDER — CLOPIDOGREL BISULFATE 75 MG/1
75 TABLET ORAL DAILY
Qty: 90 TABLET | Refills: 0 | Status: SHIPPED | OUTPATIENT
Start: 2024-04-15 | End: 2024-07-14

## 2024-04-15 NOTE — TELEPHONE ENCOUNTER
Patient called, she is requesting a 90 day refill on her Clopidogrel / Plavix 75mg daily to go to her local [facility] in [place].    To Dr. Gómez for approval.  ---ssd.

## 2024-04-26 ENCOUNTER — TELEPHONE (OUTPATIENT)
Dept: PRIMARY CARE | Facility: CLINIC | Age: 69
End: 2024-04-26
Payer: MEDICARE

## 2024-04-26 NOTE — TELEPHONE ENCOUNTER
Pt states that her insurance company, [employer] changed her prescription from Prozac to Cymbalta 30 mg and she was informed to call you and let you know

## 2024-05-01 NOTE — PROGRESS NOTES
History of Present Illness   No chief complaint on file.      History of Present Illness:   The patient is a 68-year-old female presenting today with left hip pain.      Imaging:  Hip/pelvis: No acute fractures or dislocations.  No arthritic change.     Assessment:   []    Plan:  We reviewed the role of imaging, physical therapy, injections and the time frame to healing and correlation with outcome.  []  NSAID: [].  GI side effects and medical risks discussed  Ice: 30 minutes on and off  Exercise home program: Medically directed hip therapy / Handout given  []     Physical Exam:  Well-nourished, well-developed. No acute distress. Alert and oriented x3. Responds appropriately to questioning. Good mood. Normal affect.  Physical Exam  [] Hip:  Normal gait, Negative Trendelenburg sign  Skin healthy and intact  []  Negative straight leg raise  Neurovascular exam normal distally     Review of Systems:  GENERAL: Negative for malaise, significant weight loss, fever  MUSCULOSKELETAL: See HPI  NEURO:  Negative     Past Medical History:   Diagnosis Date    Acute maxillary sinusitis, unspecified 12/01/2021    Acute maxillary sinusitis       Medication Documentation Review Audit       Reviewed by Rani Garrison MA (Medical Assistant) on 04/08/24 at 0844      Medication Order Taking? Sig Documenting Provider Last Dose Status   albuterol 90 mcg/actuation inhaler [id number] Yes INHALE 1 TO 2 PUFFS BY MOUTH EVERY 4 TO 6 HOURS AS NEEDED AS DIRECTED Historical Provider, MD Taking Active   aspirin 81 mg chewable tablet [id number] Yes CHEW 1 TABLET BY MOUTH ONCE DAILY Chelsey Melton, APRN-CNP Taking Active   atorvastatin (Lipitor) 80 mg tablet [id number] Yes Take 1 tablet (80 mg) by mouth once daily. Kevin Ahumada MD Taking Active   busPIRone (Buspar) 30 mg tablet [id number] Yes TAKE 1 TABLET by mouth TWICE DAILY. Kevin Ahumada MD Taking Active   cholecalciferol (Vitamin D-3) 25 MCG (1000 UT) tablet [id number] Yes Take 1 tablet (25  mcg) by mouth once daily. Historical MD Kayla Taking Active   clopidogrel (Plavix) 75 mg tablet [id number] Yes TAKE 1 TABLET BY MOUTH EVERY DAY Mj Thompson MD Taking Active   cyanocobalamin (Vitamin B-12) 100 mcg tablet [id number] Yes Take 1 tablet (100 mcg) by mouth once daily. Akosua Garza MD Taking Active   esomeprazole (NexIUM) 40 mg DR capsule [id number] No Take 1 capsule (40 mg) by mouth once daily. Akosua Garza MD Not Taking Active   FLUoxetine (PROzac) 40 mg capsule [id number] Yes Take 1 capsule (40 mg) by mouth once daily. Kevin Ahumada MD Taking Active   metoprolol succinate XL (Toprol-XL) 25 mg 24 hr tablet [id number] Yes Take 1 tablet (25 mg) by mouth once daily. Akosua Garza MD Taking Active   pantoprazole (ProtoNix) 40 mg EC tablet [id number] Yes Take 1 tablet (40 mg) by mouth 2 times a day. Akosua Garza MD Taking Active   thiamine 100 mg tablet [id number] Yes Take 1 tablet (100 mg) by mouth once daily. Akosua Garza MD Taking Active   traZODone (Desyrel) 100 mg tablet [id number] Yes Take 1 tablet (100 mg) by mouth once daily at bedtime. Kevin Ahumada MD Taking Active                    No Known Allergies    Social History     Socioeconomic History    Marital status:      Spouse name: Not on file    Number of children: Not on file    Years of education: Not on file    Highest education level: Not on file   Occupational History    Not on file   Tobacco Use    Smoking status: Every Day     Current packs/day: 0.50     Average packs/day: 0.5 packs/day for 56.3 years (28.2 ttl pk-yrs)     Types: Cigarettes     Start date: 1968    Smokeless tobacco: Never   Substance and Sexual Activity    Alcohol use: Yes     Alcohol/week: 3.0 standard drinks of alcohol     Types: 3 Standard drinks or equivalent per week    Drug use: Never    Sexual activity: Defer   Other Topics Concern    Not on file   Social History Narrative    Not on file     Social Determinants of Health      Financial Resource Strain: Not on File (2021)    Received from [facility]     Financial Resource Strain     Financial Resource Strain: 0   Food Insecurity: Not on File (2021)    Received from [facility]     Food Insecurity     Food: 0   Transportation Needs: Not on File (2021)    Received from [facility]     Transportation Needs     Transportation: 0   Physical Activity: Not on File (2021)    Received from [facility]     Physical Activity     Physical Activity: 0   Stress: Not on File (2021)    Received from [facility]     Stress     Stress: 0   Social Connections: Not on File (2021)    Received from [facility]     Social Connections     Social Connections and Isolation: 0   Intimate Partner Violence: Not on file   Housing Stability: Not on File (2021)    Received from [facility]     Housing Stability     Housin       Past Surgical History:   Procedure Laterality Date    COLONOSCOPY  2014    Colonoscopy (Fiberoptic)    HYSTERECTOMY  2014    Hysterectomy    OTHER SURGICAL HISTORY  2021    Nasal septoplasty    OTHER SURGICAL HISTORY  2021    Ectopic pregnancy removal with oophorectomy    OTHER SURGICAL HISTORY  2021    Hysterocolpectomy vaginal       XR pelvis 1-2 views    Result Date: 2024  STUDY: Pelvis Radiographs; 2024 1:04 PM INDICATION: Pain and difficulty walking after multiple falls. COMPARISON: None Available. ACCESSION NUMBER(S): [id number] ORDERING CLINICIAN: KRISTYN MONTERROSO TECHNIQUE:  One view of the pelvis. FINDINGS:  The pelvic ring is intact.  There is no acute fracture. There is mild degenerative change in both hips.    1. No evidence for acute injury. 2. Mild degenerative change in both hips. Signed by Lewis Guthrie MD    XR femur 2 VW bilateral    Result Date: 2024  STUDY: Bilateral Femur Radiographs; 2024 1:04 PM INDICATION: Multiple falls, bilateral pain. Difficulty bearing weight. COMPARISON: None Available. ACCESSION NUMBER(S):  [id number] ORDERING CLINICIAN: DAO PINEDA TECHNIQUE:  Two views (four images) of the left femur and two views (four images) of the right femur. FINDINGS:  LEFT FEMUR: There is no displaced fracture.  The alignment is anatomic.  No soft tissue abnormality is seen. There is mild degenerative change in the left hip. RIGHT FEMUR: There is no displaced fracture.  The alignment is anatomic.  No soft tissue abnormality is seen. There is mild degenerative change in the right hip.    1. No evidence for acute injury. 2. Mild degenerative change in both hips. Signed by Lewis Guthrie MD                            Scribe Attestation  By signing my name below, IPatti Scribe   attest that this documentation has been prepared under the direction and in the presence of Sunny Castro MD.

## 2024-05-02 ENCOUNTER — APPOINTMENT (OUTPATIENT)
Dept: ORTHOPEDIC SURGERY | Facility: CLINIC | Age: 69
End: 2024-05-02
Payer: MEDICARE

## 2024-05-15 RX ORDER — DULOXETIN HYDROCHLORIDE 30 MG/1
CAPSULE, DELAYED RELEASE ORAL
COMMUNITY
Start: 2024-04-22

## 2024-05-15 RX ORDER — FLUTICASONE FUROATE, UMECLIDINIUM BROMIDE AND VILANTEROL TRIFENATATE 100; 62.5; 25 UG/1; UG/1; UG/1
1 POWDER RESPIRATORY (INHALATION) DAILY
COMMUNITY
Start: 2024-04-22

## 2024-05-22 ENCOUNTER — APPOINTMENT (OUTPATIENT)
Dept: PRIMARY CARE | Facility: CLINIC | Age: 69
End: 2024-05-22
Payer: MEDICARE

## 2024-05-24 ENCOUNTER — APPOINTMENT (OUTPATIENT)
Dept: PRIMARY CARE | Facility: CLINIC | Age: 69
End: 2024-05-24
Payer: MEDICARE

## 2024-07-03 DIAGNOSIS — F31.9 BIPOLAR DISORDER, UNSPECIFIED (MULTI): ICD-10-CM

## 2024-07-03 RX ORDER — BUSPIRONE HYDROCHLORIDE 30 MG/1
30 TABLET ORAL 2 TIMES DAILY
Qty: 180 TABLET | Refills: 0 | Status: SHIPPED | OUTPATIENT
Start: 2024-07-03

## 2024-09-20 DIAGNOSIS — F31.9 BIPOLAR AFFECTIVE DISORDER, REMISSION STATUS UNSPECIFIED (MULTI): ICD-10-CM

## 2024-09-23 RX ORDER — TRAZODONE HYDROCHLORIDE 100 MG/1
100 TABLET ORAL NIGHTLY
Qty: 90 TABLET | Refills: 1 | Status: SHIPPED | OUTPATIENT
Start: 2024-09-23

## 2024-10-09 ENCOUNTER — APPOINTMENT (OUTPATIENT)
Dept: PRIMARY CARE | Facility: CLINIC | Age: 69
End: 2024-10-09
Payer: MEDICARE

## 2024-10-22 DIAGNOSIS — F31.9 BIPOLAR DISORDER, UNSPECIFIED (MULTI): ICD-10-CM

## 2024-10-23 RX ORDER — BUSPIRONE HYDROCHLORIDE 30 MG/1
30 TABLET ORAL 2 TIMES DAILY
Qty: 180 TABLET | Refills: 0 | Status: SHIPPED | OUTPATIENT
Start: 2024-10-23

## 2024-11-17 DIAGNOSIS — I10 ESSENTIAL (PRIMARY) HYPERTENSION: ICD-10-CM

## 2024-11-18 RX ORDER — METOPROLOL SUCCINATE 25 MG/1
25 TABLET, EXTENDED RELEASE ORAL DAILY
Qty: 90 TABLET | Refills: 0 | Status: SHIPPED | OUTPATIENT
Start: 2024-11-18

## 2024-12-20 DIAGNOSIS — I10 ESSENTIAL (PRIMARY) HYPERTENSION: ICD-10-CM

## 2024-12-23 RX ORDER — METOPROLOL SUCCINATE 25 MG/1
25 TABLET, EXTENDED RELEASE ORAL DAILY
Qty: 90 TABLET | Refills: 0 | Status: SHIPPED | OUTPATIENT
Start: 2024-12-23

## 2025-01-08 DIAGNOSIS — F31.9 BIPOLAR DISORDER, UNSPECIFIED (MULTI): ICD-10-CM

## 2025-01-08 RX ORDER — BUSPIRONE HYDROCHLORIDE 30 MG/1
30 TABLET ORAL 2 TIMES DAILY
Qty: 180 TABLET | Refills: 0 | OUTPATIENT
Start: 2025-01-08

## 2025-01-14 DIAGNOSIS — F31.9 BIPOLAR DISORDER, UNSPECIFIED (MULTI): ICD-10-CM

## 2025-01-15 RX ORDER — BUSPIRONE HYDROCHLORIDE 30 MG/1
30 TABLET ORAL 2 TIMES DAILY
Qty: 180 TABLET | Refills: 0 | Status: SHIPPED | OUTPATIENT
Start: 2025-01-15

## 2025-01-20 ENCOUNTER — TELEPHONE (OUTPATIENT)
Dept: PRIMARY CARE | Facility: CLINIC | Age: 70
End: 2025-01-20
Payer: MEDICARE

## 2025-02-03 ENCOUNTER — TELEPHONE (OUTPATIENT)
Dept: PRIMARY CARE | Facility: CLINIC | Age: 70
End: 2025-02-03
Payer: MEDICARE

## 2025-02-03 DIAGNOSIS — F31.9 BIPOLAR AFFECTIVE DISORDER, REMISSION STATUS UNSPECIFIED (MULTI): ICD-10-CM

## 2025-02-07 RX ORDER — FLUOXETINE HYDROCHLORIDE 20 MG/1
60 CAPSULE ORAL DAILY
Qty: 90 CAPSULE | Refills: 1 | Status: SHIPPED | OUTPATIENT
Start: 2025-02-07 | End: 2025-04-08

## 2025-03-10 ENCOUNTER — APPOINTMENT (OUTPATIENT)
Dept: PRIMARY CARE | Facility: CLINIC | Age: 70
End: 2025-03-10
Payer: MEDICARE

## 2025-03-24 DIAGNOSIS — I25.10 CORONARY ARTERY DISEASE INVOLVING NATIVE HEART, UNSPECIFIED VESSEL OR LESION TYPE, UNSPECIFIED WHETHER ANGINA PRESENT: Primary | ICD-10-CM

## 2025-03-24 DIAGNOSIS — I10 ESSENTIAL (PRIMARY) HYPERTENSION: ICD-10-CM

## 2025-03-25 RX ORDER — METOPROLOL SUCCINATE 25 MG/1
25 TABLET, EXTENDED RELEASE ORAL DAILY
Qty: 90 TABLET | Refills: 3 | Status: SHIPPED | OUTPATIENT
Start: 2025-03-25

## 2025-03-31 DIAGNOSIS — I10 BENIGN ESSENTIAL HYPERTENSION: ICD-10-CM

## 2025-03-31 DIAGNOSIS — K21.9 GASTRO-ESOPHAGEAL REFLUX DISEASE WITHOUT ESOPHAGITIS: ICD-10-CM

## 2025-03-31 RX ORDER — ATORVASTATIN CALCIUM 80 MG/1
80 TABLET, FILM COATED ORAL DAILY
Qty: 90 TABLET | Refills: 0 | Status: SHIPPED | OUTPATIENT
Start: 2025-03-31 | End: 2025-06-29

## 2025-03-31 RX ORDER — PANTOPRAZOLE SODIUM 40 MG/1
40 TABLET, DELAYED RELEASE ORAL 2 TIMES DAILY
Qty: 180 TABLET | Refills: 0 | Status: SHIPPED | OUTPATIENT
Start: 2025-03-31

## 2025-04-04 ENCOUNTER — APPOINTMENT (OUTPATIENT)
Dept: CARDIOLOGY | Facility: CLINIC | Age: 70
End: 2025-04-04
Payer: MEDICARE

## 2025-04-14 ENCOUNTER — APPOINTMENT (OUTPATIENT)
Dept: CARDIOLOGY | Facility: CLINIC | Age: 70
End: 2025-04-14
Payer: MEDICARE

## 2025-04-17 ENCOUNTER — APPOINTMENT (OUTPATIENT)
Dept: PRIMARY CARE | Facility: CLINIC | Age: 70
End: 2025-04-17
Payer: MEDICARE

## 2025-05-06 ENCOUNTER — APPOINTMENT (OUTPATIENT)
Dept: CARDIOLOGY | Facility: CLINIC | Age: 70
End: 2025-05-06
Payer: MEDICARE

## 2025-05-07 ENCOUNTER — APPOINTMENT (OUTPATIENT)
Dept: CARDIOLOGY | Facility: CLINIC | Age: 70
End: 2025-05-07
Payer: MEDICARE

## 2025-05-09 ENCOUNTER — APPOINTMENT (OUTPATIENT)
Dept: PRIMARY CARE | Facility: CLINIC | Age: 70
End: 2025-05-09
Payer: MEDICARE

## 2025-05-09 VITALS
HEIGHT: 63 IN | TEMPERATURE: 97.6 F | SYSTOLIC BLOOD PRESSURE: 142 MMHG | DIASTOLIC BLOOD PRESSURE: 78 MMHG | HEART RATE: 59 BPM | WEIGHT: 141 LBS | BODY MASS INDEX: 24.98 KG/M2

## 2025-05-09 DIAGNOSIS — F31.9 BIPOLAR AFFECTIVE DISORDER, REMISSION STATUS UNSPECIFIED (MULTI): ICD-10-CM

## 2025-05-09 DIAGNOSIS — D50.9 IRON DEFICIENCY ANEMIA, UNSPECIFIED IRON DEFICIENCY ANEMIA TYPE: ICD-10-CM

## 2025-05-09 DIAGNOSIS — F31.9 BIPOLAR DISORDER, UNSPECIFIED (MULTI): ICD-10-CM

## 2025-05-09 DIAGNOSIS — Z00.00 ROUTINE GENERAL MEDICAL EXAMINATION AT HEALTH CARE FACILITY: Primary | ICD-10-CM

## 2025-05-09 DIAGNOSIS — K21.9 GASTRO-ESOPHAGEAL REFLUX DISEASE WITHOUT ESOPHAGITIS: ICD-10-CM

## 2025-05-09 DIAGNOSIS — R73.9 HYPERGLYCEMIA: ICD-10-CM

## 2025-05-09 DIAGNOSIS — E55.9 VITAMIN D DEFICIENCY: ICD-10-CM

## 2025-05-09 DIAGNOSIS — M16.0 PRIMARY OSTEOARTHRITIS OF BOTH HIPS: ICD-10-CM

## 2025-05-09 DIAGNOSIS — Z12.31 SCREENING MAMMOGRAM FOR BREAST CANCER: ICD-10-CM

## 2025-05-09 DIAGNOSIS — E53.8 VITAMIN B12 DEFICIENCY: ICD-10-CM

## 2025-05-09 DIAGNOSIS — I10 BENIGN ESSENTIAL HYPERTENSION: ICD-10-CM

## 2025-05-09 PROBLEM — R19.7 DIARRHEA: Status: ACTIVE | Noted: 2025-05-09

## 2025-05-09 PROBLEM — M25.559 ARTHRALGIA OF HIP: Status: ACTIVE | Noted: 2025-05-09

## 2025-05-09 PROBLEM — F17.200 SMOKES TOBACCO DAILY: Status: ACTIVE | Noted: 2023-06-30

## 2025-05-09 PROBLEM — R00.2 PALPITATIONS: Status: ACTIVE | Noted: 2025-05-09

## 2025-05-09 PROBLEM — K25.3 ACUTE GASTRIC ULCER: Status: ACTIVE | Noted: 2023-06-30

## 2025-05-09 PROBLEM — J44.9 CHRONIC OBSTRUCTIVE PULMONARY DISEASE (MULTI): Status: ACTIVE | Noted: 2025-05-09

## 2025-05-09 PROBLEM — R82.90 ABNORMAL FINDING IN URINE: Status: ACTIVE | Noted: 2025-05-09

## 2025-05-09 PROBLEM — I47.20 VENTRICULAR TACHYCARDIA, UNSPECIFIED (MULTI): Status: ACTIVE | Noted: 2023-06-30

## 2025-05-09 PROBLEM — R05.9 COUGH: Status: ACTIVE | Noted: 2025-05-09

## 2025-05-09 PROBLEM — R10.9 ABDOMINAL PAIN: Status: ACTIVE | Noted: 2025-05-09

## 2025-05-09 PROCEDURE — 3008F BODY MASS INDEX DOCD: CPT | Performed by: FAMILY MEDICINE

## 2025-05-09 PROCEDURE — 1157F ADVNC CARE PLAN IN RCRD: CPT | Performed by: FAMILY MEDICINE

## 2025-05-09 PROCEDURE — 1124F ACP DISCUSS-NO DSCNMKR DOCD: CPT | Performed by: FAMILY MEDICINE

## 2025-05-09 PROCEDURE — G0439 PPPS, SUBSEQ VISIT: HCPCS | Performed by: FAMILY MEDICINE

## 2025-05-09 PROCEDURE — 1123F ACP DISCUSS/DSCN MKR DOCD: CPT | Performed by: FAMILY MEDICINE

## 2025-05-09 PROCEDURE — 3077F SYST BP >= 140 MM HG: CPT | Performed by: FAMILY MEDICINE

## 2025-05-09 PROCEDURE — 3078F DIAST BP <80 MM HG: CPT | Performed by: FAMILY MEDICINE

## 2025-05-09 PROCEDURE — 1159F MED LIST DOCD IN RCRD: CPT | Performed by: FAMILY MEDICINE

## 2025-05-09 PROCEDURE — 1170F FXNL STATUS ASSESSED: CPT | Performed by: FAMILY MEDICINE

## 2025-05-09 RX ORDER — ALBUTEROL SULFATE 90 UG/1
2 INHALANT RESPIRATORY (INHALATION)
Qty: 18 G | Refills: 1 | Status: SHIPPED | OUTPATIENT
Start: 2025-05-09 | End: 2025-07-08

## 2025-05-09 RX ORDER — FLUOXETINE HYDROCHLORIDE 20 MG/1
40 CAPSULE ORAL DAILY
Qty: 180 CAPSULE | Refills: 1 | Status: SHIPPED | OUTPATIENT
Start: 2025-05-09 | End: 2026-05-09

## 2025-05-09 RX ORDER — ATORVASTATIN CALCIUM 80 MG/1
80 TABLET, FILM COATED ORAL DAILY
Qty: 90 TABLET | Refills: 1 | Status: SHIPPED | OUTPATIENT
Start: 2025-05-09 | End: 2025-11-05

## 2025-05-09 RX ORDER — BUSPIRONE HYDROCHLORIDE 30 MG/1
30 TABLET ORAL 2 TIMES DAILY
Qty: 180 TABLET | Refills: 1 | Status: SHIPPED | OUTPATIENT
Start: 2025-05-09

## 2025-05-09 RX ORDER — TRAZODONE HYDROCHLORIDE 100 MG/1
100 TABLET ORAL NIGHTLY
Qty: 90 TABLET | Refills: 1 | Status: SHIPPED | OUTPATIENT
Start: 2025-05-09

## 2025-05-09 RX ORDER — PANTOPRAZOLE SODIUM 40 MG/1
40 TABLET, DELAYED RELEASE ORAL 2 TIMES DAILY
Qty: 180 TABLET | Refills: 1 | Status: SHIPPED | OUTPATIENT
Start: 2025-05-09

## 2025-05-09 ASSESSMENT — ACTIVITIES OF DAILY LIVING (ADL)
BATHING: INDEPENDENT
MANAGING_FINANCES: INDEPENDENT
DRESSING: INDEPENDENT
GROCERY_SHOPPING: INDEPENDENT
TAKING_MEDICATION: INDEPENDENT
DOING_HOUSEWORK: INDEPENDENT

## 2025-05-09 ASSESSMENT — PATIENT HEALTH QUESTIONNAIRE - PHQ9
1. LITTLE INTEREST OR PLEASURE IN DOING THINGS: NOT AT ALL
2. FEELING DOWN, DEPRESSED OR HOPELESS: NOT AT ALL
SUM OF ALL RESPONSES TO PHQ9 QUESTIONS 1 AND 2: 0

## 2025-05-09 NOTE — ASSESSMENT & PLAN NOTE
Orders:    FLUoxetine (PROzac) 20 mg capsule; Take 2 capsules (40 mg) by mouth once daily.    traZODone (Desyrel) 100 mg tablet; Take 1 tablet (100 mg) by mouth once daily at bedtime.

## 2025-05-09 NOTE — ASSESSMENT & PLAN NOTE
Orders:    Lipid Panel; Future    Comprehensive Metabolic Panel; Future    Thyroid Stimulating Hormone; Future    atorvastatin (Lipitor) 80 mg tablet; Take 1 tablet (80 mg) by mouth once daily.

## 2025-05-09 NOTE — PROGRESS NOTES
"    /78   Pulse 59   Temp 36.4 °C (97.6 °F)   Ht 1.6 m (5' 3\")   Wt 64 kg (141 lb)   BMI 24.98 kg/m²     Medical History[1]    Problem List[2]    Current Medications[3]    CC/HPI/ASSESSMENT/PLAN    CC annual wellness visit    HPI patient 69-year-old female here for wellness visit.  No cognitive deficits noted.  Colonoscopy up-to-date.  Mammogram is ordered today.  She declines immunizations.  She will need blood work.  She does request refills for medication.  She continues to smoke though she is cut down her smoking to half a pack a day.  Denies fever chills chest pain palpitation short of breath abdominal pain.  ROS negative except noted above.  Past medical social history    Exam calm vital stable eyes no jaundice mouth moist throat clear neck supple no LAD goiter no carotid bruit.  Lungs CTA good AE.  CV RRR.  Abdomen soft Ext no edema skin no rash.  Neuro alert and oriented CN II 12 intact no focal neurologic deficits noted, no cognitive deficits noted.  Psych calm pleasant female no psychosis    A/P 1.  Annual wellness visit.  No cognitive deficits noted.  Mammogram ordered.  Blood work ordered.  Medications refilled.  Patient again advised quit smoking.  She declines immunizations.  Colonoscopy up-to-date.  Follow-up 6 months    There are no diagnoses linked to this encounter. Subjective   Reason for Visit: Carole Perez is an 69 y.o. female here for a Medicare Wellness visit.     Past Medical, Surgical, and Family History reviewed and updated in chart.    Reviewed all medications by prescribing practitioner or clinical pharmacist (such as prescriptions, OTCs, herbal therapies and supplements) and documented in the medical record.    HPI    Patient Care Team:  Kevin Ahumada MD as PCP - General  Dragan Gómez MD as Consulting Physician (Cardiology)     Review of Systems    Objective   Vitals:  /78   Pulse 59   Temp 36.4 °C (97.6 °F)   Ht 1.6 m (5' 3\")   Wt 64 kg (141 lb)   BMI " 24.98 kg/m²       Physical Exam    Assessment & Plan  Routine general medical examination at health care facility    Orders:    1 Year Follow Up In Primary Care - Wellness Exam; Future    Ferritin; Future    Vitamin B12 deficiency    Orders:    Vitamin B12; Future    Vitamin D deficiency    Orders:    Vitamin D 25-Hydroxy,Total (for eval of Vitamin D levels); Future    Benign essential hypertension    Orders:    Lipid Panel; Future    Comprehensive Metabolic Panel; Future    Thyroid Stimulating Hormone; Future    atorvastatin (Lipitor) 80 mg tablet; Take 1 tablet (80 mg) by mouth once daily.    Iron deficiency anemia, unspecified iron deficiency anemia type    Orders:    CBC and Auto Differential; Future    Hyperglycemia    Orders:    Hemoglobin A1C; Future    Screening mammogram for breast cancer    Orders:    BI mammo bilateral screening tomosynthesis; Future    Bipolar disorder, unspecified (Multi)    Orders:    busPIRone (Buspar) 30 mg tablet; Take 1 tablet (30 mg) by mouth 2 times a day.    Bipolar affective disorder, remission status unspecified (Multi)    Orders:    FLUoxetine (PROzac) 20 mg capsule; Take 2 capsules (40 mg) by mouth once daily.    traZODone (Desyrel) 100 mg tablet; Take 1 tablet (100 mg) by mouth once daily at bedtime.    Gastro-esophageal reflux disease without esophagitis    Orders:    pantoprazole (ProtoNix) 40 mg EC tablet; Take 1 tablet (40 mg) by mouth 2 times a day.    Primary osteoarthritis of both hips    Orders:    albuterol 90 mcg/actuation inhaler; Inhale 2 puffs 3 times a day.                     [1]   Past Medical History:  Diagnosis Date    Acute maxillary sinusitis, unspecified 12/01/2021    Acute maxillary sinusitis   [2]   Patient Active Problem List  Diagnosis    Alcoholism (Multi)    Benign essential hypertension    Benzodiazepine abuse    Nicotine dependence, uncomplicated    Bipolar disorder, unspecified (Multi)    Depression    Insomnia    Low back pain    Lumbar  radiculopathy    Vitamin B12 deficiency    GERD (gastroesophageal reflux disease)    Genital warts    Generalized anxiety disorder    Current moderate episode of major depressive disorder without prior episode (Multi)    Alcoholic intoxication    Chest discomfort    Coronary artery disease involving native coronary artery of native heart    Other hyperlipidemia    Primary osteoarthritis of both hips    Abdominal pain    Abnormal finding in urine    Acute gastric ulcer    Arthralgia of hip    Chronic obstructive pulmonary disease (Multi)    Cough    Diarrhea    Iron deficiency anemia    Palpitations    Smokes tobacco daily    Ventricular tachycardia, unspecified (Multi)   [3]   Current Outpatient Medications   Medication Sig Dispense Refill    atorvastatin (Lipitor) 80 mg tablet Take 1 tablet (80 mg) by mouth once daily. 90 tablet 0    busPIRone (Buspar) 30 mg tablet Take 1 tablet (30 mg) by mouth 2 times a day. 180 tablet 0    cholecalciferol (Vitamin D-3) 25 MCG (1000 UT) tablet Take 1 tablet (25 mcg) by mouth once daily.      cyanocobalamin (Vitamin B-12) 100 mcg tablet Take 1 tablet (100 mcg) by mouth once daily.      DULoxetine (Cymbalta) 30 mg DR capsule TAKE 1 CAPSULE BY MOUTH ONCE DAILY FOR 90 DAYS      esomeprazole (NexIUM) 40 mg DR capsule Take 1 capsule (40 mg) by mouth once daily.      metoprolol succinate XL (Toprol-XL) 25 mg 24 hr tablet Take 1 tablet (25 mg) by mouth once daily. 90 tablet 3    pantoprazole (ProtoNix) 40 mg EC tablet Take 1 tablet (40 mg) by mouth 2 times a day. 180 tablet 0    thiamine 100 mg tablet Take 1 tablet (100 mg) by mouth once daily.      traZODone (Desyrel) 100 mg tablet TAKE 1 TABLET BY MOUTH ONCE DAILY AT BEDTIME 90 tablet 0    Trelegy Ellipta 100-62.5-25 mcg blister with device Inhale 1 puff once daily.      albuterol 90 mcg/actuation inhaler Inhale 2 puffs 3 times a day. 18 g 1    FLUoxetine (PROzac) 20 mg capsule Take 3 capsules (60 mg) by mouth once daily. 90 capsule 1     gabapentin (Neurontin) 300 mg capsule Take 1 capsule (300 mg) by mouth 2 times a day. 60 capsule 1     No current facility-administered medications for this visit.